# Patient Record
Sex: MALE | Race: BLACK OR AFRICAN AMERICAN | NOT HISPANIC OR LATINO | Employment: FULL TIME | URBAN - METROPOLITAN AREA
[De-identification: names, ages, dates, MRNs, and addresses within clinical notes are randomized per-mention and may not be internally consistent; named-entity substitution may affect disease eponyms.]

---

## 2022-11-26 ENCOUNTER — HOSPITAL ENCOUNTER (EMERGENCY)
Facility: HOSPITAL | Age: 52
Discharge: HOME | End: 2022-11-26
Attending: EMERGENCY MEDICINE
Payer: COMMERCIAL

## 2022-11-26 VITALS
SYSTOLIC BLOOD PRESSURE: 176 MMHG | HEART RATE: 86 BPM | RESPIRATION RATE: 20 BRPM | HEIGHT: 67 IN | BODY MASS INDEX: 29.91 KG/M2 | TEMPERATURE: 98 F | OXYGEN SATURATION: 97 % | DIASTOLIC BLOOD PRESSURE: 95 MMHG | WEIGHT: 190.56 LBS

## 2022-11-26 VITALS
DIASTOLIC BLOOD PRESSURE: 87 MMHG | BODY MASS INDEX: 30.13 KG/M2 | OXYGEN SATURATION: 97 % | HEART RATE: 84 BPM | TEMPERATURE: 98 F | RESPIRATION RATE: 16 BRPM | SYSTOLIC BLOOD PRESSURE: 172 MMHG | HEIGHT: 67 IN | WEIGHT: 192 LBS

## 2022-11-26 DIAGNOSIS — K08.89 PAIN, DENTAL: Primary | ICD-10-CM

## 2022-11-26 DIAGNOSIS — M54.10 RADICULOPATHY, UNSPECIFIED SPINAL REGION: ICD-10-CM

## 2022-11-26 PROCEDURE — 99284 EMERGENCY DEPT VISIT MOD MDM: CPT | Mod: ER

## 2022-11-26 PROCEDURE — 99283 EMERGENCY DEPT VISIT LOW MDM: CPT | Mod: 27,ER

## 2022-11-26 RX ORDER — CYCLOBENZAPRINE HCL 10 MG
10 TABLET ORAL 3 TIMES DAILY PRN
Qty: 15 TABLET | Refills: 0 | Status: SHIPPED | OUTPATIENT
Start: 2022-11-26 | End: 2022-12-01

## 2022-11-26 RX ORDER — DICLOFENAC SODIUM 50 MG/1
50 TABLET, DELAYED RELEASE ORAL 2 TIMES DAILY
Qty: 10 TABLET | Refills: 0 | Status: SHIPPED | OUTPATIENT
Start: 2022-11-26 | End: 2022-12-01

## 2022-11-26 RX ORDER — HYDROCODONE BITARTRATE AND ACETAMINOPHEN 10; 325 MG/1; MG/1
1 TABLET ORAL EVERY 6 HOURS PRN
Qty: 15 TABLET | Refills: 0 | Status: SHIPPED | OUTPATIENT
Start: 2022-11-26

## 2022-11-26 RX ORDER — AMOXICILLIN AND CLAVULANATE POTASSIUM 875; 125 MG/1; MG/1
1 TABLET, FILM COATED ORAL 2 TIMES DAILY
Qty: 14 TABLET | Refills: 0 | Status: SHIPPED | OUTPATIENT
Start: 2022-11-26 | End: 2022-12-03

## 2022-11-26 RX ORDER — IBUPROFEN 200 MG
200 TABLET ORAL EVERY 6 HOURS PRN
COMMUNITY

## 2022-11-26 NOTE — ED PROVIDER NOTES
HISTORY     Chief Complaint   Patient presents with    Dental Pain     Left lower dental pain, left arm numbness, left facial pan      Review of patient's allergies indicates:  No Known Allergies     HPI   52-year-old male presents emergency department for left lower dental pain which has been ongoing times several days.  Patient reports he has tried taking Motrin for his symptoms without any relief.  Patient also reports over the last several days he is had intermittent pain going from the left side of his face around the area of pain into his neck.  Patient also reports intermittently the pain travels from his neck and down his left arm.  Patient reports the pain is an aching, numbness, and tingling pain.  Patient denies any specific injury.  Patient denies any fever, chills, chest pain, shortness of breath, back pain, abdominal pain, nausea, vomiting, and all other concerns at the time.    The history is provided by the patient. No  was used.      PCP: Primary Doctor No     Past Medical History:  No past medical history on file.     Past Surgical History:  No past surgical history on file.     Family History:  No family history on file.     Social History:  Social History     Tobacco Use    Smoking status: Not on file    Smokeless tobacco: Not on file   Substance and Sexual Activity    Alcohol use: Not on file    Drug use: Not on file    Sexual activity: Not on file         ROS   Review of Systems   Constitutional:  Negative for fever.   HENT:  Positive for dental problem. Negative for sore throat.    Respiratory:  Negative for shortness of breath.    Cardiovascular:  Negative for chest pain.   Gastrointestinal:  Negative for nausea.   Genitourinary:  Negative for dysuria.   Musculoskeletal:  Negative for back pain.   Skin:  Negative for rash.   Neurological:  Positive for numbness (tingling). Negative for weakness.   Hematological:  Does not bruise/bleed easily.     PHYSICAL EXAM  "    Initial Vitals [11/26/22 1426]   BP Pulse Resp Temp SpO2   (!) 176/95 86 20 98 °F (36.7 °C) 97 %      MAP       --           Physical Exam    Nursing note and vitals reviewed.  Constitutional: He appears well-developed and well-nourished. He is not diaphoretic. No distress.   HENT:   Head: Normocephalic and atraumatic.   Erythema noted to the gum tissue around the left lower molars.  There is no abscess.  There is no drainage.  There is no trismus.   Eyes: Right eye exhibits no discharge. Left eye exhibits no discharge.   Neck: Neck supple.   Normal range of motion.  Cardiovascular:  Normal rate.           Pulmonary/Chest: No respiratory distress.   Abdominal: He exhibits no distension.   Musculoskeletal:         General: Normal range of motion.      Cervical back: Normal range of motion and neck supple.      Comments: Patient has full range of motion of the left upper extremity.  There is no obvious deformities.  Distal pulses 2+.  Neurovascularly intact.     Neurological: He is alert and oriented to person, place, and time. He has normal strength.   Skin: Skin is warm and dry.   Psychiatric: He has a normal mood and affect. His behavior is normal. Thought content normal.        ED COURSE   Procedures  ED ONGOING VITALS:  Vitals:    11/26/22 1426   BP: (!) 176/95   Pulse: 86   Resp: 20   Temp: 98 °F (36.7 °C)   TempSrc: Oral   SpO2: 97%   Weight: 86.4 kg (190 lb 9.4 oz)   Height: 5' 7" (1.702 m)         ABNORMAL LAB VALUES:  Labs Reviewed - No data to display      ALL LAB VALUES:  none      RADIOLOGY STUDIES:  Imaging Results    None                   The above vital signs and test results have been reviewed by the emergency provider.     ED Medications:  Current Discharge Medication List        START taking these medications    Details   amoxicillin-clavulanate 875-125mg (AUGMENTIN) 875-125 mg per tablet Take 1 tablet by mouth 2 (two) times daily. for 7 days  Qty: 14 tablet, Refills: 0      cyclobenzaprine " (FLEXERIL) 10 MG tablet Take 1 tablet (10 mg total) by mouth 3 (three) times daily as needed.  Qty: 15 tablet, Refills: 0    Comments: Second choice      diclofenac (VOLTAREN) 50 MG EC tablet Take 1 tablet (50 mg total) by mouth 2 (two) times daily. for 5 days  Qty: 10 tablet, Refills: 0           Discharge Medications:  New Prescriptions    AMOXICILLIN-CLAVULANATE 875-125MG (AUGMENTIN) 875-125 MG PER TABLET    Take 1 tablet by mouth 2 (two) times daily. for 7 days    CYCLOBENZAPRINE (FLEXERIL) 10 MG TABLET    Take 1 tablet (10 mg total) by mouth 3 (three) times daily as needed.    DICLOFENAC (VOLTAREN) 50 MG EC TABLET    Take 1 tablet (50 mg total) by mouth 2 (two) times daily. for 5 days       Follow-up Information       pcp of choice.    Why: As needed             Schedule an appointment as soon as possible for a visit  with Dentist of choice.                            I discussed with patient and/or family/caretaker that evaluation in the ED does not suggest any emergent or life threatening medical conditions requiring immediate intervention beyond what was provided in the ED, and I believe patient is safe for discharge. Regardless, an unremarkable evaluation in the ED does not preclude the development or presence of a serious or life threatening condition. As such, patient was instructed to return immediately for any worsening or change in current symptoms.        MEDICAL DECISION MAKING   Medical Decision Making:   ED Management:  Patient reports he has no past medical history of renal insufficiency.  I feel that is appropriate to place the patient on anti-inflammatories x5 days to help him with his current symptoms.             CLINICAL IMPRESSION       ICD-10-CM ICD-9-CM   1. Pain, dental  K08.89 525.9   2. Radiculopathy, unspecified spinal region  M54.10 729.2       Disposition:   Disposition: Discharged  Condition: Stable       Jeffy Pillai NP  11/26/22 1444       Jeffy Pillai NP  11/26/22  9808

## 2022-11-27 NOTE — ED PROVIDER NOTES
Encounter Date: 11/26/2022       History     Chief Complaint   Patient presents with    Dental Pain     Seen here for same earlier. Meds not helping- flexeril, augmentin, and voltaren     HPI  Pt seen earlier for Dental Pain, reports Naprosyn not working. Pt denies fever, vomiting.    Review of patient's allergies indicates:  No Known Allergies  No past medical history on file.  No past surgical history on file.  No family history on file.     Review of Systems   Constitutional:  Negative for fever.   HENT:  Positive for dental problem.    Respiratory:  Negative for shortness of breath.    Cardiovascular:  Negative for chest pain.   Gastrointestinal:  Negative for abdominal pain.   Genitourinary:  Negative for dysuria.   Musculoskeletal:  Negative for back pain.   Neurological:  Negative for syncope and speech difficulty.   Hematological:  Does not bruise/bleed easily.     Physical Exam     Initial Vitals [11/26/22 2216]   BP Pulse Resp Temp SpO2   (!) 172/87 84 16 98 °F (36.7 °C) 97 %      MAP       --         Physical Exam    Nursing note and vitals reviewed.  Constitutional: He appears well-developed and well-nourished.   HENT:   Head: Normocephalic and atraumatic.   Mouth/Throat: Oropharynx is clear and moist. Abnormal dentition. No dental abscesses.   Eyes: Conjunctivae and EOM are normal. Pupils are equal, round, and reactive to light.   Neck: Neck supple.   Normal range of motion.  Cardiovascular:  Normal rate and regular rhythm.           Pulmonary/Chest: Breath sounds normal.   Abdominal: Abdomen is soft. Bowel sounds are normal.   Musculoskeletal:         General: Normal range of motion.      Cervical back: Normal range of motion and neck supple.     Neurological: He is alert and oriented to person, place, and time.   Skin: Skin is warm and dry.   Psychiatric: He has a normal mood and affect. Thought content normal.       ED Course   Procedures  Labs Reviewed - No data to display       Imaging Results     None          Medications - No data to display                           Clinical Impression:   Final diagnoses:  [K08.89] Pain, dental (Primary)      ED Disposition Condition    Discharge Stable          ED Prescriptions       Medication Sig Dispense Start Date End Date Auth. Provider    HYDROcodone-acetaminophen (NORCO)  mg per tablet Take 1 tablet by mouth every 6 (six) hours as needed for Pain. 15 tablet 11/26/2022 -- Jordon Duffy MD          Follow-up Information       Follow up With Specialties Details Why Contact Info    Dentist  Call in 3 days As needed     [place] - Emergency Dept Emergency Medicine  If symptoms worsen [address]  [facility] [id number] [phone]             Jordon Duffy MD  11/26/22 5099

## 2024-12-09 ENCOUNTER — HOSPITAL ENCOUNTER (EMERGENCY)
Facility: HOSPITAL | Age: 54
Discharge: HOME | End: 2024-12-09
Attending: STUDENT IN AN ORGANIZED HEALTH CARE EDUCATION/TRAINING PROGRAM
Payer: COMMERCIAL

## 2024-12-09 VITALS
HEIGHT: 66 IN | BODY MASS INDEX: 30.37 KG/M2 | DIASTOLIC BLOOD PRESSURE: 84 MMHG | HEART RATE: 84 BPM | WEIGHT: 189 LBS | OXYGEN SATURATION: 99 % | TEMPERATURE: 98 F | RESPIRATION RATE: 16 BRPM | SYSTOLIC BLOOD PRESSURE: 134 MMHG

## 2024-12-09 DIAGNOSIS — I10 HYPERTENSION, UNSPECIFIED TYPE: ICD-10-CM

## 2024-12-09 DIAGNOSIS — H53.8 BLURRED VISION, LEFT EYE: ICD-10-CM

## 2024-12-09 DIAGNOSIS — R51.9 ACUTE NONINTRACTABLE HEADACHE, UNSPECIFIED HEADACHE TYPE: Primary | ICD-10-CM

## 2024-12-09 LAB
ALBUMIN SERPL BCP-MCNC: 4.3 G/DL (ref 3.5–5.2)
ALP SERPL-CCNC: 66 U/L (ref 55–135)
ALT SERPL W/O P-5'-P-CCNC: 30 U/L (ref 10–44)
ANION GAP SERPL CALC-SCNC: 7 MMOL/L (ref 8–16)
AST SERPL-CCNC: 23 U/L (ref 10–40)
BASOPHILS # BLD AUTO: 0.04 K/UL (ref 0–0.2)
BASOPHILS NFR BLD: 0.6 % (ref 0–1.9)
BILIRUB SERPL-MCNC: 0.2 MG/DL (ref 0.1–1)
BUN SERPL-MCNC: 17 MG/DL (ref 6–20)
CALCIUM SERPL-MCNC: 9.5 MG/DL (ref 8.7–10.5)
CHLORIDE SERPL-SCNC: 107 MMOL/L (ref 95–110)
CHOLEST SERPL-MCNC: 189 MG/DL (ref 120–199)
CHOLEST/HDLC SERPL: 5.6 {RATIO} (ref 2–5)
CO2 SERPL-SCNC: 23 MMOL/L (ref 23–29)
CREAT SERPL-MCNC: 1.3 MG/DL (ref 0.5–1.4)
CREAT SERPL-MCNC: 1.4 MG/DL (ref 0.5–1.4)
DIFFERENTIAL METHOD BLD: ABNORMAL
EOSINOPHIL # BLD AUTO: 0.2 K/UL (ref 0–0.5)
EOSINOPHIL NFR BLD: 2.4 % (ref 0–8)
ERYTHROCYTE [DISTWIDTH] IN BLOOD BY AUTOMATED COUNT: 12.6 % (ref 11.5–14.5)
EST. GFR  (NO RACE VARIABLE): >60 ML/MIN/1.73 M^2
GLUCOSE SERPL-MCNC: 164 MG/DL (ref 70–110)
HCT VFR BLD AUTO: 41.7 % (ref 40–54)
HDLC SERPL-MCNC: 34 MG/DL (ref 40–75)
HDLC SERPL: 18 % (ref 20–50)
HGB BLD-MCNC: 13.5 G/DL (ref 14–18)
IMM GRANULOCYTES # BLD AUTO: 0.01 K/UL (ref 0–0.04)
IMM GRANULOCYTES NFR BLD AUTO: 0.1 % (ref 0–0.5)
INR PPP: 0.9 (ref 0.8–1.2)
LDLC SERPL CALC-MCNC: 124.4 MG/DL (ref 63–159)
LYMPHOCYTES # BLD AUTO: 3.1 K/UL (ref 1–4.8)
LYMPHOCYTES NFR BLD: 44.3 % (ref 18–48)
MCH RBC QN AUTO: 26.7 PG (ref 27–31)
MCHC RBC AUTO-ENTMCNC: 32.4 G/DL (ref 32–36)
MCV RBC AUTO: 82 FL (ref 82–98)
MONOCYTES # BLD AUTO: 0.6 K/UL (ref 0.3–1)
MONOCYTES NFR BLD: 8.3 % (ref 4–15)
NEUTROPHILS # BLD AUTO: 3.1 K/UL (ref 1.8–7.7)
NEUTROPHILS NFR BLD: 44.3 % (ref 38–73)
NONHDLC SERPL-MCNC: 155 MG/DL
NRBC BLD-RTO: 0 /100 WBC
PLATELET # BLD AUTO: 271 K/UL (ref 150–450)
PMV BLD AUTO: 9.3 FL (ref 9.2–12.9)
POTASSIUM SERPL-SCNC: 3.6 MMOL/L (ref 3.5–5.1)
PROT SERPL-MCNC: 8 G/DL (ref 6–8.4)
PROTHROMBIN TIME: 10.4 SEC (ref 9–12.5)
RBC # BLD AUTO: 5.06 M/UL (ref 4.6–6.2)
SAMPLE: NORMAL
SODIUM SERPL-SCNC: 137 MMOL/L (ref 136–145)
TRIGL SERPL-MCNC: 153 MG/DL (ref 30–150)
TSH SERPL DL<=0.005 MIU/L-ACNC: 2.07 UIU/ML (ref 0.34–5.6)
WBC # BLD AUTO: 6.97 K/UL (ref 3.9–12.7)

## 2024-12-09 PROCEDURE — 84443 ASSAY THYROID STIM HORMONE: CPT | Performed by: STUDENT IN AN ORGANIZED HEALTH CARE EDUCATION/TRAINING PROGRAM

## 2024-12-09 PROCEDURE — 25500020 PHARM REV CODE 255: Performed by: STUDENT IN AN ORGANIZED HEALTH CARE EDUCATION/TRAINING PROGRAM

## 2024-12-09 PROCEDURE — 80061 LIPID PANEL: CPT | Performed by: STUDENT IN AN ORGANIZED HEALTH CARE EDUCATION/TRAINING PROGRAM

## 2024-12-09 PROCEDURE — 99285 EMERGENCY DEPT VISIT HI MDM: CPT | Mod: 25

## 2024-12-09 PROCEDURE — 85610 PROTHROMBIN TIME: CPT | Performed by: STUDENT IN AN ORGANIZED HEALTH CARE EDUCATION/TRAINING PROGRAM

## 2024-12-09 PROCEDURE — 82962 GLUCOSE BLOOD TEST: CPT

## 2024-12-09 PROCEDURE — 80053 COMPREHEN METABOLIC PANEL: CPT | Performed by: STUDENT IN AN ORGANIZED HEALTH CARE EDUCATION/TRAINING PROGRAM

## 2024-12-09 PROCEDURE — 93010 ELECTROCARDIOGRAM REPORT: CPT | Mod: ,,, | Performed by: GENERAL PRACTICE

## 2024-12-09 PROCEDURE — 82565 ASSAY OF CREATININE: CPT

## 2024-12-09 PROCEDURE — 36415 COLL VENOUS BLD VENIPUNCTURE: CPT | Performed by: STUDENT IN AN ORGANIZED HEALTH CARE EDUCATION/TRAINING PROGRAM

## 2024-12-09 PROCEDURE — 85025 COMPLETE CBC W/AUTO DIFF WBC: CPT | Performed by: STUDENT IN AN ORGANIZED HEALTH CARE EDUCATION/TRAINING PROGRAM

## 2024-12-09 PROCEDURE — 93005 ELECTROCARDIOGRAM TRACING: CPT | Performed by: GENERAL PRACTICE

## 2024-12-09 RX ORDER — AMLODIPINE BESYLATE 5 MG/1
5 TABLET ORAL DAILY
Qty: 90 TABLET | Refills: 3 | Status: SHIPPED | OUTPATIENT
Start: 2024-12-09 | End: 2025-12-09

## 2024-12-09 RX ADMIN — IOHEXOL 100 ML: 350 INJECTION, SOLUTION INTRAVENOUS at 06:12

## 2024-12-10 LAB
OHS QRS DURATION: 100 MS
OHS QTC CALCULATION: 441 MS
POCT GLUCOSE: 162 MG/DL (ref 70–110)

## 2024-12-10 NOTE — ED PROVIDER NOTES
Encounter Date: 12/9/2024       History     Chief Complaint   Patient presents with    Headache     PRESSURE ONSET APPROX 330 TODAY    Blurred Vision     LEFT EYE TODAY APPROX 340, LASTING APPROX 5 MIN     54-year-old male history of hypertension presents for evaluation of headache.  Associated aura to left eye with some blurred vision lasted roughly 4 minutes and resolved.  Patient had continued generalized headache.  Patient had these symptoms happened last year.  Patient checked his blood pressure and was in 160s to 170 systolic.  Patient presented to urgent care and then sent to ED for further evaluation.  Onset of symptoms around 3-330 p.m..    The history is provided by the patient.     Review of patient's allergies indicates:  No Known Allergies  No past medical history on file.  No past surgical history on file.  No family history on file.     Review of Systems   All other systems reviewed and are negative.      Physical Exam     Initial Vitals   BP Pulse Resp Temp SpO2   12/09/24 1823 12/09/24 1823 12/09/24 1823 12/09/24 1829 12/09/24 1823   (!) 176/84 85 16 97.9 °F (36.6 °C) 99 %      MAP       --                Physical Exam    Nursing note and vitals reviewed.  Constitutional: Vital signs are normal. He is not diaphoretic.  Non-toxic appearance.   HENT:   Head: Normocephalic.   Eyes: No scleral icterus.   Pulmonary/Chest: No stridor. No respiratory distress.   Bilateral chest rise   Abdominal: There is no guarding.   Musculoskeletal:         General: No tenderness.      Cervical back: No rigidity.     Neurological: He is alert.   NIH scale of 0   Skin: Skin is warm and dry. No rash noted.   Psychiatric: His speech is normal. He is not actively hallucinating.   Not anxious  or agitated         ED Course   Procedures  Labs Reviewed   CBC W/ AUTO DIFFERENTIAL - Abnormal       Result Value    WBC 6.97      RBC 5.06      Hemoglobin 13.5 (*)     Hematocrit 41.7      MCV 82      MCH 26.7 (*)     MCHC 32.4       RDW 12.6      Platelets 271      MPV 9.3      Immature Granulocytes 0.1      Gran # (ANC) 3.1      Immature Grans (Abs) 0.01      Lymph # 3.1      Mono # 0.6      Eos # 0.2      Baso # 0.04      nRBC 0      Gran % 44.3      Lymph % 44.3      Mono % 8.3      Eosinophil % 2.4      Basophil % 0.6      Differential Method Automated     COMPREHENSIVE METABOLIC PANEL - Abnormal    Sodium 137      Potassium 3.6      Chloride 107      CO2 23      Glucose 164 (*)     BUN 17      Creatinine 1.3      Calcium 9.5      Total Protein 8.0      Albumin 4.3      Total Bilirubin 0.2      Alkaline Phosphatase 66      AST 23      ALT 30      eGFR >60.0      Anion Gap 7 (*)    LIPID PANEL - Abnormal    Cholesterol 189      Triglycerides 153 (*)     HDL 34 (*)     LDL Cholesterol 124.4      HDL/Cholesterol Ratio 18.0 (*)     Total Cholesterol/HDL Ratio 5.6 (*)     Non-HDL Cholesterol 155     PROTIME-INR    Prothrombin Time 10.4      INR 0.9     TSH    TSH 2.068     ISTAT CREATININE    POC Creatinine 1.4      Sample VENOUS     POCT GLUCOSE MONITORING CONTINUOUS          Imaging Results              CTA Head and Neck (xpd) (Final result)  Result time 12/09/24 19:57:09      Final result by Gerald Edgar MD (12/09/24 19:57:09)                   Impression:      No acute abnormality. No high-grade stenosis or major vessel occlusion.      Electronically signed by: Gerald Edgar  Date:    12/09/2024  Time:    19:57               Narrative:    EXAMINATION:  CTA HEAD AND NECK (XPD)    CLINICAL HISTORY:  Neuro deficit, acute, stroke suspected;    TECHNIQUE:  CT angiogram was performed from the level of the noelle to the top of the head following the IV administration of 100mL of Omnipaque 350.   Sagittal and coronal reconstructions and maximum intensity projection reconstructions were performed. Arterial stenosis percentages are based on NASCET measurement criteria.    COMPARISON:  None    FINDINGS:  Non-Vascular Structures of the  Neck/Thoracic Inlet (limited evaluation): Normal.    Aorta: No acute findings    Extracranial carotid circulation: No hemodynamically significant stenosis, aneurysmal dilatation, or dissection.    Extracranial vertebral circulation: No hemodynamically significant stenosis, aneurysmal dilatation, or dissection.    Intracranial Arteries: No focal high-grade stenosis, occlusion, or aneurysm.    Venous structures (limited evaluation): Normal.                                       CT HEAD FOR STROKE (Final result)  Result time 12/09/24 18:44:21      Final result by Gerald Edgar MD (12/09/24 18:44:21)                   Impression:      No acute abnormality.      Electronically signed by: Gerald Edgar  Date:    12/09/2024  Time:    18:44               Narrative:    EXAMINATION:  CT HEAD FOR STROKE    CLINICAL HISTORY:  Neuro deficit, acute, stroke suspected;    TECHNIQUE:  Low dose axial CT images obtained throughout the head without intravenous contrast. Sagittal and coronal reconstructions were performed.    COMPARISON:  None.    FINDINGS:  Intracranial compartment:    Ventricles and sulci are normal in size for age without evidence of hydrocephalus. No extra-axial blood or fluid collections.    The brain parenchyma appears normal. No parenchymal mass, hemorrhage, edema or major vascular distribution infarct.    Skull/extracranial contents (limited evaluation): No fracture. Mastoid air cells and paranasal sinuses are essentially clear.                                       Medications   iohexoL (OMNIPAQUE 350) injection 100 mL (100 mLs Intravenous Given 12/9/24 1850)     Medical Decision Making  54-year-old male presents for evaluation of headache and visual disturbance.  Visual disturbance lasted for minutes, fully resolved, headache resolved on arrival.  Triage provider activated code stroke.  CT head with no hemorrhage, CTA head and neck with no acute abnormality.  Patient asymptomatic here in ED  feeling much better and repeat blood pressure 134/84.  Within differential diagnosis could be ischemic stroke versus TIA versus hypertensive emergency versus complex migraine.  Informed patient of this differential diagnosis and further workup would be MRI brain to rule out any brain injury.  Patient declined further workup has capacity to make his decision would like to be discharged blood pressure medicine prescription.  Patient lives in [place] and just in town for work.  Patient we will be discharged with blood pressure medication per request and no further stroke workup at this time.  He is aware that this could have been a TIA.    -patient not a thrombolytics candidate, NIH scale of 0.    Amount and/or Complexity of Data Reviewed  Labs: ordered. Decision-making details documented in ED Course.  Radiology: ordered.  ECG/medicine tests: ordered and independent interpretation performed.     Details: EKG rate 82, , , normal sinus rhythm, no STEMI    Risk  Prescription drug management.      Additional MDM:     NIH Stroke Scale:   Interval = baseline (upon arrival/admit)  Level of consciousness = 0 - alert  LOC questions = 0 - answers both correctly  LOC commands = 0 - performs both correctly  Best gaze = 0 - normal  Visual = 0 - no visual loss  Facial palsy = 0 - normal  Motor left arm =  0 - no drift  Motor right arm =  0 - no drift  Motor left leg = 0 - no drift  Motor right leg =  0 - no drift  Limb ataxia = 0 - absent  Sensory = 0 - normal  Best language = 0 - no aphasia  Dysarthria = 0 - normal articulation  Extinction and inattention = 0 - no neglect  NIH Stroke Scale Total = 0              ED Course as of 12/09/24 2021   Mon Dec 09, 2024   1902 WBC: 6.97 [KB]   1902 Hemoglobin(!): 13.5 [KB]   1902 Hematocrit: 41.7 [KB]   1902 POC Creatinine: 1.4 [KB]   1914 Sodium: 137 [KB]   1914 Potassium: 3.6 [KB]   1914 Chloride: 107 [KB]   1914 CO2: 23 [KB]   1914 Glucose(!): 164 [KB]   1914 BUN: 17 [KB]    1914 Creatinine: 1.3 [KB]   1914 Calcium: 9.5 [KB]   1914 PROTEIN TOTAL: 8.0 [KB]   1914 Albumin: 4.3 [KB]   1914 BILIRUBIN TOTAL: 0.2 [KB]   1914 ALP: 66 [KB]   1914 AST: 23 [KB]   1914 ALT: 30 [KB]   1914 eGFR: >60.0 [KB]   1914 Anion Gap(!): 7 [KB]   1914 Cholesterol Total: 189 [KB]   1914 Triglycerides(!): 153 [KB]   1914 HDL(!): 34 [KB]   1914 LDL Cholesterol: 124.4 [KB]   1914 HDL/Cholesterol Ratio(!): 18.0 [KB]   1914 Total Cholesterol/HDL Ratio(!): 5.6 [KB]   1914 Non-HDL Cholesterol: 155 [KB]   2001 TSH: 2.068 [KB]   2007 TSH: 2.068      Thrombolysis Candidate? No, Non-disabling symptoms - Low NIHSS   NIHSS 0  Delays to Thrombolysis?  No   [KB]      ED Course User Index  [KB] Terrance Flanagan Jr., DO                           Clinical Impression:  Final diagnoses:  [R51.9] Acute nonintractable headache, unspecified headache type (Primary)  [H53.8] Blurred vision, left eye  [I10] Hypertension, unspecified type          ED Disposition Condition    Discharge Stable          ED Prescriptions       Medication Sig Dispense Start Date End Date Auth. Provider    amLODIPine (NORVASC) 5 MG tablet Take 1 tablet (5 mg total) by mouth once daily. 90 tablet 12/9/2024 12/9/2025 Terrance Flanagan Jr., DO          Follow-up Information    None          Terrance Flanagan Jr.,   12/09/24 2021       Terrance Flanagan Jr.,   12/09/24 2021

## 2024-12-10 NOTE — DISCHARGE INSTRUCTIONS
Follow up with primary care physician for further management evaluation and return to ED for any worsening symptoms.  Would need further workup with MRI to rule out stroke

## 2024-12-10 NOTE — SUBJECTIVE & OBJECTIVE
HPI:  54 y.o. male with a PMHx signficiatn for presenting with blurry visoin, headache. LKN around 1530.      Vitals:    12/09/24 1829   BP:    Pulse:    Resp:    Temp: 97.9 °F (36.6 °C)     BG     Images personally reviewed and interpreted:  Study: Head CT and CTA Head & Neck  Study Interpretation: ***     Assessment and plan:  ***    Lytics recommendation: {THROMBOLYTIC THERAPY RECOMMENDATION:67585}    Thrombectomy recommendation: {INTERVENTIONAL REVASCULARIZATION CANDIDATE:97909}  Placement recommendation: {TELESTROKE DISPOSITION RECOMMENDATION:86176}